# Patient Record
Sex: FEMALE | Race: WHITE | URBAN - METROPOLITAN AREA
[De-identification: names, ages, dates, MRNs, and addresses within clinical notes are randomized per-mention and may not be internally consistent; named-entity substitution may affect disease eponyms.]

---

## 2018-08-09 ENCOUNTER — HOSPITAL ENCOUNTER (OUTPATIENT)
Dept: NUTRITION | Facility: HOSPITAL | Age: 73
End: 2018-08-09
Attending: INTERNAL MEDICINE | Admitting: INTERNAL MEDICINE

## 2020-01-24 ENCOUNTER — HISTORICAL (OUTPATIENT)
Dept: ADMINISTRATIVE | Facility: HOSPITAL | Age: 75
End: 2020-01-24

## 2020-03-11 ENCOUNTER — HISTORICAL (OUTPATIENT)
Dept: ADMINISTRATIVE | Facility: HOSPITAL | Age: 75
End: 2020-03-11

## 2021-03-15 ENCOUNTER — HISTORICAL (OUTPATIENT)
Dept: ADMINISTRATIVE | Facility: HOSPITAL | Age: 76
End: 2021-03-15

## 2022-04-09 ENCOUNTER — HISTORICAL (OUTPATIENT)
Dept: ADMINISTRATIVE | Facility: HOSPITAL | Age: 77
End: 2022-04-09

## 2022-04-25 VITALS
BODY MASS INDEX: 22.55 KG/M2 | WEIGHT: 122.56 LBS | SYSTOLIC BLOOD PRESSURE: 124 MMHG | DIASTOLIC BLOOD PRESSURE: 76 MMHG | HEIGHT: 62 IN

## 2022-04-30 NOTE — ED PROVIDER NOTES
"   Patient:   Lencho Collins             MRN: [id number]            FIN: [id number]               Age:   73 years     Sex:  Female     :  1945   Associated Diagnoses:   Chest pain   Author:   Emily Barron MD      Basic Information   Time seen: Date & time 2018 09:15:00.   History source: Patient.   Arrival mode: Private vehicle, walking.   History limitation: None.   Additional information: Chief Complaint from Nursing Triage Note : Chief Complaint   2018 9:14 CDT        Chief Complaint           c/o L sided CP x2 days, with L arm aching. +nausea. denies cardiac hx.  .      History of Present Illness   The patient presents with chest pain, SORT:  Female with recent dual antibiotic use for leg wound having nausea and abdominal discomfort now worsening with chest pain seen and PCP office with abnormal EKG directed to emergency department for further evaluation.  Roshan BATES and     I, Dr. Barron, assumed care of this patient at 1009.    74 y/o female with history of HTN, HLD, and remote DVT presents to ED c/o left-sided chest "ache" that radiates into her left arm onset Saturday. Patient states that her pain is exacerbated with exertion and alleviated with rest. She was seen by her PCP this morning, at which time EKG was performed. It showed a left bundle branch block. He had no old for comparison. Patient had previous EKG's completed at outside facilities, but never at [facility]. She has not seen a cardiologist in over 15 years. Patient also reports nausea and decreased appetite. She denies diaphoresis, SOB, dizziness, fever, cough, and any bleeding. She is currently CP free     .  The onset was 5  days ago.  The course/duration of symptoms is fluctuating in intensity.  Location: Left chest. Radiating pain: left arm. The character of symptoms is achy.  The degree at onset was moderate.  The degree at maximum was moderate.  The degree at present is minimal.  The exacerbating factor is " exertion.  The relieving factor is rest.  Risk factors consist of hypertension, hyperlipidemia and deep vein thrombosis.  Prior episodes: none.  Therapy today None.  Associated symptoms: nausea.        Review of Systems   Constitutional symptoms:  Decreased appetite, No fever,    Skin symptoms:  No rash,    Eye symptoms:  No recent vision problems,    ENMT symptoms:  No sore throat,    Respiratory symptoms:  No shortness of breath, no cough.    Cardiovascular symptoms:  Chest pain, left chest, intermittent, achy, radiating to the left arm, no syncope, no diaphoresis.    Gastrointestinal symptoms:  Nausea, no abdominal pain, no vomiting, no diarrhea, no constipation.    Genitourinary symptoms:  No dysuria,    Neurologic symptoms:  No headache, no dizziness.       Health Status   Allergies: No known allergies.   Medications:  (Selected)   Inpatient Medications  Ordered  NS (0.9% Sodium Chloride) Infusion 500 mL: 500 mL, 500 mL, IV, 500 mL/hr, start date 08/09/18 10:29:00 CDT  aspirin 81 mg oral tablet, CHEWABLE: 324 mg, form: Tab-Chew, Oral, Once, first dose 08/09/18 10:21:00 CDT, stop date 08/09/18 10:21:00 CDT, STAT, 4 chew tab = 5 grain ASA  nitroglycerin 0.4 mg sublingual TAB: 0.4 mg, form: Tab-SL, SL, q5min PRN for chest pain, first dose 08/09/18 10:21:00 CDT, STAT  Documented Medications  Documented  DOXYCYCLINE HYCLATE 100 MG CAP: 100 mg = 1 cap(s), Oral, BID  DULOXETINE HCL DR 30 MG CAP: 30 mg = 1 cap(s), Oral, Daily  HYDROCHLOROTHIAZIDE 12.5 MG TB: 12.5 mg = 1 tab(s), Oral, Daily  LOSARTAN POTASSIUM 100 MG TAB: 100 mg = 1 tab(s), Oral, Daily  OLOPATADINE HCL 0.2% EYE DROP:   PRAVASTATIN SODIUM 80 MG TAB:   SULFAMETHOXAZOLE-TMP DS TABLET: 1 tab(s), Oral, BID.      Past Medical/ Family/ Social History   Medical history: HTN, HLD, DVT.   Surgical history: hip replacement x3.   Family history:    No family history items have been selected or recorded..   Social history:    Social & Psychosocial  Habits    Tobacco  08/09/2018  Use: Never smoker  , Tobacco use: former smoker.      Physical Examination               Vital Signs   Vital Signs   8/9/2018 9:39 CDT        Peripheral Pulse Rate     73 bpm                             Heart Rate Monitored      74 bpm                             Respiratory Rate          16 br/min                             SpO2                      97 %                             Oxygen Therapy            Room air                             Systolic Blood Pressure   130 mmHg                             Diastolic Blood Pressure  90 mmHg                             Mean Arterial Pressure, Cuff              103 mmHg    8/9/2018 9:14 CDT        Temperature Oral          36.8 DegC                             Temperature Oral (calculated)             98.24 DegF                             Peripheral Pulse Rate     93 bpm                             Respiratory Rate          16 br/min                             SpO2                      97 %                             Oxygen Therapy            Room air                             Systolic Blood Pressure   148 mmHg  HI                             Diastolic Blood Pressure  89 mmHg  .   Measurements   8/9/2018 9:14 CDT        Weight Dosing             55 kg                             Weight Measured and Calculated in Lbs     121.25 lb                             Height/Length Dosing      157 cm  .   Basic Oxygen Information   8/9/2018 9:39 CDT        SpO2                      97 %                             Oxygen Therapy            Room air    8/9/2018 9:14 CDT        SpO2                      97 %                             Oxygen Therapy            Room air  .   General:  Alert, no acute distress.    Skin:  Warm, dry, pink, intact, no pallor, no rash.    Head:  Normocephalic, atraumatic.    Neck:  Supple, trachea midline.    Eye:  Extraocular movements are intact, normal conjunctiva.    Ears, nose, mouth and throat:  Oral mucosa  moist.   Cardiovascular:  Regular rate and rhythm, No murmur, Normal peripheral perfusion, No edema.    Respiratory:  Lungs are clear to auscultation, respirations are non-labored, breath sounds are equal, Symmetrical chest wall expansion.    Gastrointestinal:  Soft, Nontender, Non distended, Normal bowel sounds.    Back:  Normal range of motion.   Musculoskeletal:  Normal ROM, No calf edema, asymmetry, erythema, warmth, tenderness to palpation or palpable cords appreciated bilaterally.    Neurological:  Alert and oriented to person, place, time, and situation, No focal neurological deficit observed.    Psychiatric:  Cooperative.      Medical Decision Making   Differential Diagnosis:  Myocardial infarction, non-ST elevation myocardial infarction, unstable angina, angina, anxiety, pulmonary embolism, gastroesophageal reflux disease, bronchitis.    Rationale:  73-year-old female with history of hypertension, hyperlipidemia and GERD, no known history of CAD, presents for evaluation of exertional chest pain.  She was seen at her PCPs office earlier this morning where EKG showed a left bundle branch brought.  No old EKG for comparison.  EKG here again consistent with left bundle branch block, no changes from previous tracing this morning.  She is currently chest pain-free.  She is well-appearing.  Normotensive, not tachycardic, 97% on room air.  No evidence of DVT.  Screening cardiac workup initiated and is negative for acute findings.  Records from [facility] requested to compare old EKG.  CTA of the chest obtained to assess for PE which is negative.  She has been given aspirin.  I will consult cardiology for admission for serial troponins and further evaluation.  She is amenable to admission.   Documents reviewed:  Emergency department nurses' notes.   Orders  Launch Order Profile (Selected)   Inpatient Orders  Ordered  Blood Pressure: 08/09/18 9:18:00 CDT, Stop date 08/09/18 9:18:00 CDT, Monitor blood pressure.  Cardiac  Monitorin18 9:18:00 CDT, Constant Order, Place on telemetry.  Contact MD for order for Aspirin and NTG.: 18 9:18:00 CDT, Contact MD for order for Aspirin and NTG.  Discharge Planning Ongoing Assessment: 18 9:00:00 CDT, q3day  NS (0.9% Sodium Chloride) Infusion 500 mL: 500 mL, 500 mL, IV, 500 mL/hr, start date 18 10:29:00 CDT  Oxygen Therapy: 18 9:18:00 CDT, Nasal Cannula, Maintain O2 saturation > 93%., CM Oxygen  Pulse Oximetry: 18 9:18:00 CDT, Stop date 18 9:18:00 CDT, Place on pulse oximetry.  Monitor oxygen saturation.  Saline Lock Insert: 18 9:18:00 CDT, Stop date 18 9:18:00 CDT  aspirin 81 mg oral tablet, CHEWABLE: 324 mg, form: Tab-Chew, Oral, Once, first dose 18 10:21:00 CDT, stop date 18 10:21:00 CDT, STAT, 4 chew tab = 5 grain ASA  nitroglycerin 0.4 mg sublingual TAB: 0.4 mg, form: Tab-SL, SL, q5min PRN for chest pain, first dose 18 10:21:00 CDT, STAT  Ordered (Exam Ordered)  CT Thorax W Contrast: Stat, 18 10:29:00 CDT, Pulmonary Embolism, None, Stretcher, Rad Type, Schedule this test, 18 10:29:00 CDT  Completed  Automated Diff: STAT collect, 18 9:31:00 CDT, Blood, Collected, Stop date 18 9:31:00 CDT, Lab Collect, Print Label By Order Location, 18 9:18:00 CDT  CBC w/ Auto Diff: STAT collect, 18 9:31:39 CDT, BLOOD, Collected, Stop date 18 9:31:00 CDT, Lab Collect  CMP: STAT collect, 18 9:31:39 CDT, BLOOD, Collected, Stop date 18 9:31:00 CDT, Lab Collect  EKG Adult 12 Lead: 18 9:18:00 CDT, Stat, Chest Pain, 18 9:18:00 CDT, Show to provider upon completion., -1, -1, 18 9:18:00 CDT  Estimated Glomerular Filtration Rate: STAT collect, 18 9:31:00 CDT, Blood, Collected, Stop date 18 9:31:00 CDT, Lab Collect, Print Label By Order Location, 18 9:18:00 CDT  Lipase Level: STAT collect, 18 9:31:39 CDT, BLOOD, Collected, Stop date 18 9:31:00 CDT,  Lab Collect  Troponin-I: STAT collect, 08/09/18 9:31:39 CDT, BLOOD, Collected, Stop date 08/09/18 9:19:00 CDT, Lab Collect  XR Chest 1 View: Stat, 08/09/18 9:18:00 CDT, Chest Pain, None, Portable, Rad Type, Not Scheduled, 08/09/18 9:18:00 CDT.   Electrocardiogram:  Time 8/9/2018 09:15:00, rate 87, normal sinus rhythm, no ectopy, EP Interp, T wave Inversion, I, , AVL, P wave and HI interval WNL, QT interval WNL, QRS interval Left bundle branch block, Previous EKG available No changes.    Results review:  Lab results : Lab View   8/9/2018 9:31 CDT        Sodium Lvl                138 mmol/L                             Potassium Lvl             4.6 mmol/L                             Chloride                  103 mmol/L                             CO2                       29.0 mmol/L                             Calcium Lvl               9.2 mg/dL                             Glucose Lvl               88 mg/dL                             BUN                       13.0 mg/dL                             Creatinine                0.72 mg/dL                             eGFR-AA                   >60 mL/min/1.73 m2  NA                             eGFR-DAGO                  >60 mL/min/1.73 m2  NA                             Bili Total                0.7 mg/dL                             Bili Direct               0.10 mg/dL                             Bili Indirect             0.60 mg/dL                             AST                       37 unit/L                             ALT                       50 unit/L                             Alk Phos                  58 unit/L                             Total Protein             7.7 gm/dL                             Albumin Lvl               4.00 gm/dL                             Globulin                  3.70 gm/dL  HI                             A/G Ratio                 1.1 ratio                             Lipase Lvl                169 unit/L                             Total  CK                  111 unit/L                             CK MB                     1.7 ng/mL                             Troponin-I                <0.02 ng/mL                             WBC                       3.6 x10(3)/mcL  LOW                             RBC                       4.87 x10(6)/mcL                             Hgb                       15.1 gm/dL                             Hct                       46.1 %                             Platelet                  248 x10(3)/mcL                             MCV                       94.7 fL  HI                             MCH                       31.0 pg                             MCHC                      32.8 gm/dL  LOW                             RDW                       13.2 %                             MPV                       8.7 fL  LOW                             Abs Neut                  1.66 x10(3)/mcL  LOW                             Neutro Auto               46 %  NA                             Lymph Auto                40 %  NA                             Mono Auto                 8 %  NA                             Eos Auto                  5 %  NA                             Abs Eos                   0.2 x10(3)/mcL                             Basophil Auto             1 %  NA                             Abs Neutro                1.66 x10(3)/mcL                             Abs Lymph                 1.5 x10(3)/mcL                             Abs Mono                  0.3 x10(3)/mcL                             Abs Baso                  0.0 x10(3)/mcL  .   Chest X-Ray:  No acute disease process, interpretation by Emergency Physician.    Radiology results:  Rad Results (ST)  < 12 hrs   Accession: [id number]  Order: CT Thorax W Contrast  Report Dt/Tm: 08/09/2018 11:37  Report:      History  Chest pain.     Reference  None available.     Technique  Helical acquisition through the chest with IV contrast targeting the  pulmonary arteries.  Multiplanar and 3D MIP reconstructed images were  provided for review.  mGycm. Automatic exposure control,  adjustment of mA/kV or iterative reconstruction technique was used to  reduce radiation.     Findings  There is good opacification of the pulmonary arterial tree. There is  no evidence of pulmonary thromboembolism. No aortic dissection.      There is no mediastinal, hilar or axillary lymphadenopathy by size  criteria. Heart is not significantly enlarged. Main pulmonary artery  mildly dilated measuring 3.5 cm.     No pleural effusions. No opacity suspicious for pneumonia. There is  minimal atelectasis.      There are numerous hypoattenuating liver lesions suggestive of cysts.  The largest is in the left lobe measuring about 3 cm maximally. There  is a subcentimeter right adrenal nodule which cannot be further  characterized. There are moderate degenerative changes of the spine.     Impression  Negative for pulmonary thromboembolic disease. No acute findings in  the chest. Chronic findings above.      Accession: [id number]  Order: XR Chest 1 View  Report Dt/Tm: 08/09/2018 09:41  Report:   Indication: Chest pain     Findings: No prior studies are available for comparison. Heart size is  normal and lungs are clear bilaterally. Pulmonary vasculature is  normal.     IMPRESSION: No evidence of acute disease.      .      Reexamination/ Reevaluation   Time: 8/9/2018 11:40:00 .   Course: progressing as expected.   Pain status: resolved.   Assessment: exam unchanged.      Impression and Plan   Diagnosis   Chest pain (PNED [id number])      Calls-Consults   -  8/9/2018 11:44:00 , [facility].    Plan   Condition: Improved, Stable.    Disposition: Admit time  8/9/2018 11:44:00, Place in Observation Telemetry Unit, Kaylene HIDALGO, Zi RHODES    Counseled: Patient, Family, Regarding diagnosis, Regarding diagnostic results, Regarding treatment plan, Patient indicated understanding of instructions.    Notes: I,  Lani Mulligan, acted solely as a scribe for and in the presence of Dr. Barron who performed the service., I, Dr. Emily Barron, personally evaluated and examined this patient and agree with the documentation as above. .